# Patient Record
Sex: MALE | Race: WHITE | NOT HISPANIC OR LATINO | URBAN - METROPOLITAN AREA
[De-identification: names, ages, dates, MRNs, and addresses within clinical notes are randomized per-mention and may not be internally consistent; named-entity substitution may affect disease eponyms.]

---

## 2022-05-10 ENCOUNTER — OUTPATIENT (OUTPATIENT)
Dept: OUTPATIENT SERVICES | Facility: HOSPITAL | Age: 42
LOS: 1 days | End: 2022-05-10
Payer: COMMERCIAL

## 2022-05-10 DIAGNOSIS — Z20.828 CONTACT WITH AND (SUSPECTED) EXPOSURE TO OTHER VIRAL COMMUNICABLE DISEASES: ICD-10-CM

## 2022-05-10 LAB — SARS-COV-2 RNA SPEC QL NAA+PROBE: SIGNIFICANT CHANGE UP

## 2022-05-10 PROCEDURE — U0005: CPT

## 2022-05-10 PROCEDURE — U0003: CPT

## 2023-01-03 ENCOUNTER — APPOINTMENT (OUTPATIENT)
Dept: ORTHOPEDIC SURGERY | Facility: CLINIC | Age: 43
End: 2023-01-03
Payer: COMMERCIAL

## 2023-01-03 DIAGNOSIS — M54.50 LOW BACK PAIN, UNSPECIFIED: ICD-10-CM

## 2023-01-03 DIAGNOSIS — M43.10 SPONDYLOLISTHESIS, SITE UNSPECIFIED: ICD-10-CM

## 2023-01-03 PROBLEM — Z00.00 ENCOUNTER FOR PREVENTIVE HEALTH EXAMINATION: Status: ACTIVE | Noted: 2023-01-03

## 2023-01-03 PROCEDURE — 72100 X-RAY EXAM L-S SPINE 2/3 VWS: CPT

## 2023-01-03 PROCEDURE — 99204 OFFICE O/P NEW MOD 45 MIN: CPT

## 2023-01-03 RX ORDER — MELOXICAM 15 MG/1
15 TABLET ORAL DAILY
Qty: 30 | Refills: 1 | Status: ACTIVE | COMMUNITY
Start: 2023-01-03 | End: 1900-01-01

## 2023-01-03 RX ORDER — METHYLPREDNISOLONE 4 MG/1
4 TABLET ORAL
Qty: 1 | Refills: 0 | Status: ACTIVE | COMMUNITY
Start: 2023-01-03 | End: 1900-01-01

## 2023-01-03 NOTE — DISCUSSION/SUMMARY
[de-identified] : The patient has a lower back pain with xrays findings of an L5-S1 retrolisthesis and possible lumbar disc herniation.\par \par Xrays obtained today in the office and reviewed with the patient. \par \par The patient is started on a course of steroids with a Medrol Dose Jamey.\par A script for Mobic is also provided, the patient is advised to hold off until the steroid jamey is done. \par He is provided a script for PT. \par \par Follow up with one of the spine physician in 4-6 weeks if he continues to have pain. If not, the patient will follow up on an as needed basis.

## 2023-01-03 NOTE — HISTORY OF PRESENT ILLNESS
[de-identified] : The patient is a 42 year old male here today with complaints of nonradiating lower back pain for the past 6 days. The patient states he was doing squats and slightly leaned forward, he felt significant pain that has continued to worsen. The patient has tried Aleve, is using a heating pad and lidocaine patches and switched his shoes to clogs which is helping with his pain. The patient has no complaints of numbness or paraesthesias radiating down the legs. No complaints of radiculopathy. No bowel or bladder incontinence.

## 2023-01-03 NOTE — PHYSICAL EXAM
[Normal Coordination] : normal coordination [Normal Sensation] : normal sensation [Normal Mood and Affect] : normal mood and affect [Orientated] : orientated [Able to Communicate] : able to communicate [Normal Skin] : normal skin [No obvious lymphadenopathy in areas examined] : no obvious lymphadenopathy in areas examined [Well Developed] : well developed [Well Nourished] : well nourished [Peripheral vascular exam is grossly normal] : peripheral vascular exam is grossly normal [No Respiratory Distress] : no respiratory distress [5___] : right extensor hallicus longus 5[unfilled]/5 [] : clonus not sustained at ankle [[id number]] : forward flexion 15 degrees [de-identified] : extension 5 degrees

## 2023-01-19 ENCOUNTER — APPOINTMENT (OUTPATIENT)
Dept: PAIN MANAGEMENT | Facility: CLINIC | Age: 43
End: 2023-01-19
Payer: COMMERCIAL

## 2023-01-19 VITALS — HEIGHT: 74 IN | WEIGHT: 255 LBS | BODY MASS INDEX: 32.73 KG/M2

## 2023-01-19 DIAGNOSIS — S39.012A STRAIN OF MUSCLE, FASCIA AND TENDON OF LOWER BACK, INITIAL ENCOUNTER: ICD-10-CM

## 2023-01-19 DIAGNOSIS — M54.16 RADICULOPATHY, LUMBAR REGION: ICD-10-CM

## 2023-01-19 PROCEDURE — 99203 OFFICE O/P NEW LOW 30 MIN: CPT

## 2023-01-20 PROBLEM — M54.16 LUMBAR RADICULOPATHY: Status: ACTIVE | Noted: 2023-01-20

## 2023-01-20 PROBLEM — S39.012A STRAIN OF LUMBAR REGION, INITIAL ENCOUNTER: Status: ACTIVE | Noted: 2023-01-20

## 2023-01-20 NOTE — ASSESSMENT
[FreeTextEntry1] : A discussion regarding available pain management treatment options occurred with the patient.  These included interventional, rehabilitative, pharmacological, and alternative modalities. We will proceed with the following:  \par \par Interventional treatment options:\par - None indicated present time\par - see additional instructions below  \par \par Rehabilitative options:\par - initiate trial of physical therapy\par - participation in active HEP was discussed  \par \par Medication based treatment options:\par - Completed MDP\par - continue meloxicam 7.5-15 mg daily as needed\par - see additional instructions below  \par \par Complementary treatment options:\par - Weight management and lifestyle modifications discussed\par \par Additional treatment recommendations as follows:\par - Follow-up in 6 to 8 weeks to assess response to conservative care\par - We will obtain MRI lumbar spine with return of severe radicular pain\par \par We have discussed the risks, benefits, and alternatives NSAID therapy including but not limited to the risk of bleeding, thrombosis, gastric mucosal irritation/ulceration, allergic reaction and kidney dysfunction; the patient verbalizes an understanding.\par \par The documentation recorded by the scribe, in my presence, accurately reflects the service I personally performed and the decisions made by me with my edits as appropriate. \par \par I, Christopher Banks acting as scribe, attest that this documentation has been prepared under the direction and in the presence of Provider Sacha Michaels DO.

## 2023-01-20 NOTE — HISTORY OF PRESENT ILLNESS
[Lower back] : lower back [Buttock] : buttock [Left Leg] : left leg [Right Leg] : right leg [Sudden] : sudden [7] : 7 [Dull/Aching] : dull/aching [Intermittent] : intermittent [Meds] : meds [FreeTextEntry1] : The patient presents for initial evaluation regarding their lower back pain. Referred by Dr. Argueta. There is radiation intermittently to the to the left sided buttocks to the back of the left thigh stopping at the knee. The episode was acute onset; 3 weeks ago, 2 days before New Years, patient was squatting at the gym, and lurched forward inciting the back pain. He claims he does not feel any pain currently.  Patient denies any weakness in the limbs or loss of bladder and bowel control.\par \par Subjective weakness: No\par Lower extremity paresthesias: No\par Bladder/bowel dysfunction: No\par \par Injections: No\par \par Pertinent Surgical History: N/A\par \par Imaging:\par X-Ray Lumbar Spine (1/3/2023) - [facility] \par \par Physician Disclaimer: I have personally reviewed and confirmed all HPI data with the patient. [] : Post Surgical Visit: no [de-identified] : X-RAY

## 2023-01-20 NOTE — PHYSICAL EXAM
[de-identified] : Constitutional:\par - No acute distress\par - Well developed; well nourished\par \par Neurological:\par - normal mood and affect\par - alert and oriented x 3\par \par Cardiovascular:\par - grossly normal\par \par Lumbar Spine Exam:\par \par Inspection:\par erythema (-)\par ecchymosis (-)\par rashes (-)\par alignment: no scoliosis\par \par Palpation:\par Midline lumbar tenderness:            (-)\par midline thoracic tenderness:          (-)\par Lumbar paraspinal tenderness:      L (-) ; R (-)\par thoracic paraspinal tenderness:    L (-) ; R (-)\par sciatic nerve tenderness :             L (-) ; R (-)\par SI joint tenderness:                        L (-) ; R (-)\par GTB tenderness:                            L (-);  R (-)\par \par ROM: Full ROM with mild stiffness\par \par Strength:\par                                    Right       Left   \par Hip Flexion:                (5/5)       (5/5)\par Quadriceps:               (5/5)       (5/5)\par Hamstrings:                (5/5)       (5/5)\par Ankle Dorsiflexion:     (5/5)       (5/5)\par EHL:                           (5/5)       (5/5)\par Ankle Plantarflexion:  (5/5)       (5/5)\par \par Special Tests:\par SLR:                           R (-) ; L (-)\par Facet loading:            R (-) ; L (-)\par SAFIA test:               R (-) ; L (-)\par Hamstring tightness:  R (-);  L (-)\par \par Neurologic:\par SI LT throughout right lower extremity\par \par SI LT throughout left lower extremity\par \par Reflexes normal and symmetric bilateral lower extremities\par \par Gait:\par non- antalgic gait\par ambulates without assistive device

## 2023-02-03 ENCOUNTER — APPOINTMENT (OUTPATIENT)
Dept: ORTHOPEDIC SURGERY | Facility: CLINIC | Age: 43
End: 2023-02-03